# Patient Record
Sex: MALE | Race: OTHER | HISPANIC OR LATINO
[De-identification: names, ages, dates, MRNs, and addresses within clinical notes are randomized per-mention and may not be internally consistent; named-entity substitution may affect disease eponyms.]

---

## 2023-09-02 ENCOUNTER — APPOINTMENT (OUTPATIENT)
Dept: PODIATRY | Facility: CLINIC | Age: 28
End: 2023-09-02
Payer: COMMERCIAL

## 2023-09-02 DIAGNOSIS — Z78.9 OTHER SPECIFIED HEALTH STATUS: ICD-10-CM

## 2023-09-02 DIAGNOSIS — M79.674 PAIN IN RIGHT TOE(S): ICD-10-CM

## 2023-09-02 PROBLEM — Z00.00 ENCOUNTER FOR PREVENTIVE HEALTH EXAMINATION: Status: ACTIVE | Noted: 2023-09-02

## 2023-09-02 PROCEDURE — 99203 OFFICE O/P NEW LOW 30 MIN: CPT

## 2023-09-02 RX ORDER — CEPHALEXIN 500 MG/1
500 CAPSULE ORAL
Qty: 20 | Refills: 0 | Status: ACTIVE | COMMUNITY
Start: 2023-09-02 | End: 1900-01-01

## 2023-09-07 PROBLEM — M79.674 PAIN OF TOE OF RIGHT FOOT: Status: ACTIVE | Noted: 2023-09-06

## 2023-09-07 NOTE — HISTORY OF PRESENT ILLNESS
[Sneakers] : katlyn [FreeTextEntry1] : Patient presents today with a complaint of right hallux, lateral nail border pain with redness, swelling and drainage.  Patient states that he has had ingrown nails as a child.  Approximately, one year ago, he had an ingrown nail which was treated with a partial nail avulsion by another provider.  He also was treated for fungus, which has come back after the procedure.  He is currently undergoing laser treatments for it and it has significantly improved the thickness and the appearance of the nail.  Approximately one month ago, the nail started to become very painful.  He denies any trauma to it.  He denies any current interventions for it.  Patient denies any fever or chills.  Patient works as a .

## 2023-09-07 NOTE — ASSESSMENT
[FreeTextEntry1] : Impression: Ingrown right hallux nail, lateral border (L60.0) with pain (M79.674) and paronychia (L03.031).  Treatment: Discussed etiology and treatment options.   Since patient continues to get recurrent ingrown nails, he is a candidate for permanent partial nail avulsion with chemical matrixectomy.  Discussed risks and benefits of the procedure.  Currently, he has paronychia.  Will treat with a slant back resection of the nail and antibiotics, which will be a temporary solution to this problem.  Once the redness and swelling has resolved, will perform a partial nail avulsion with chemical matrixectomy as a permanent option.  Advised patient to wear shoes, which have a roomy toebox to avoid further pressure to the toe.  Start to soak in warm water and Epsom salts daily and apply a small amount of antibiotic cream and a Band-Aid until next appointment.   Return: 3 weeks, as he can only come in on a Saturday.

## 2023-09-07 NOTE — PHYSICAL EXAM
[2+] : left foot dorsalis pedis 2+ [No Focal Deficits] : no focal deficits [Ankle Swelling (On Exam)] : not present [Varicose Veins Of Lower Extremities] : not present [FreeTextEntry3] : CFT: 3 seconds x 10.  Temperature gradient: normal.   [de-identified] : Pain on palpation, right hallux.   [FreeTextEntry1] : Right hallux, lateral nail border is incurvated with redness, swelling and pain, localized to the lateral nail fold.  No ascending cellulitis, fluctuance or granuloma.  No active drainage.

## 2023-09-08 ENCOUNTER — APPOINTMENT (OUTPATIENT)
Dept: PODIATRY | Facility: CLINIC | Age: 28
End: 2023-09-08
Payer: COMMERCIAL

## 2023-09-08 DIAGNOSIS — L60.0 INGROWING NAIL: ICD-10-CM

## 2023-09-08 DIAGNOSIS — L03.031 CELLULITIS OF RIGHT TOE: ICD-10-CM

## 2023-09-08 PROCEDURE — 11730 AVULSION NAIL PLATE SIMPLE 1: CPT | Mod: T5

## 2023-09-08 NOTE — HISTORY OF PRESENT ILLNESS
[FreeTextEntry1] : Patient presents today for follow up of right hallux, lateral nail border pain with redness, swelling and drainage

## 2023-09-11 PROBLEM — L60.0 INGROWN NAIL: Status: ACTIVE | Noted: 2023-09-06

## 2023-09-11 PROBLEM — L03.031 PARONYCHIA OF TOE OF RIGHT FOOT: Status: ACTIVE | Noted: 2023-09-02
